# Patient Record
Sex: FEMALE | Race: BLACK OR AFRICAN AMERICAN | Employment: UNEMPLOYED | URBAN - METROPOLITAN AREA
[De-identification: names, ages, dates, MRNs, and addresses within clinical notes are randomized per-mention and may not be internally consistent; named-entity substitution may affect disease eponyms.]

---

## 2020-04-19 ENCOUNTER — APPOINTMENT (OUTPATIENT)
Dept: GENERAL RADIOLOGY | Age: 35
End: 2020-04-19
Payer: COMMERCIAL

## 2020-04-19 ENCOUNTER — HOSPITAL ENCOUNTER (EMERGENCY)
Age: 35
Discharge: HOME | End: 2020-04-19
Attending: EMERGENCY MEDICINE
Payer: COMMERCIAL

## 2020-04-19 ENCOUNTER — APPOINTMENT (OUTPATIENT)
Dept: CT IMAGING | Age: 35
End: 2020-04-19
Payer: COMMERCIAL

## 2020-04-19 VITALS
WEIGHT: 138.89 LBS | SYSTOLIC BLOOD PRESSURE: 120 MMHG | OXYGEN SATURATION: 100 % | HEART RATE: 80 BPM | BODY MASS INDEX: 25.4 KG/M2 | RESPIRATION RATE: 16 BRPM | DIASTOLIC BLOOD PRESSURE: 95 MMHG

## 2020-04-19 PROCEDURE — 70450 CT HEAD/BRAIN W/O DYE: CPT

## 2020-04-19 PROCEDURE — 72125 CT NECK SPINE W/O DYE: CPT

## 2020-04-19 PROCEDURE — 73100 X-RAY EXAM OF WRIST: CPT

## 2020-04-19 PROCEDURE — 99284 EMERGENCY DEPT VISIT MOD MDM: CPT

## 2020-04-19 ASSESSMENT — PAIN - FUNCTIONAL ASSESSMENT: PAIN_FUNCTIONAL_ASSESSMENT: ACTIVITIES ARE NOT PREVENTED

## 2020-04-19 ASSESSMENT — PAIN DESCRIPTION - ONSET: ONSET: GRADUAL

## 2020-04-19 ASSESSMENT — PAIN DESCRIPTION - DESCRIPTORS: DESCRIPTORS: DISCOMFORT

## 2020-04-19 ASSESSMENT — PAIN DESCRIPTION - PROGRESSION: CLINICAL_PROGRESSION: GRADUALLY WORSENING

## 2020-04-19 ASSESSMENT — PAIN SCALES - GENERAL
PAINLEVEL_OUTOF10: 7
PAINLEVEL_OUTOF10: 7

## 2020-04-19 ASSESSMENT — PAIN DESCRIPTION - LOCATION: LOCATION: FACE;JAW;NECK

## 2020-04-19 ASSESSMENT — ENCOUNTER SYMPTOMS
SHORTNESS OF BREATH: 0
ABDOMINAL PAIN: 0

## 2020-04-19 ASSESSMENT — PAIN DESCRIPTION - PAIN TYPE: TYPE: ACUTE PAIN

## 2020-04-19 ASSESSMENT — PAIN DESCRIPTION - FREQUENCY: FREQUENCY: CONTINUOUS
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
## 2020-04-19 NOTE — ED PROVIDER NOTES
tablet by mouth every 6 hours as needed for Pain    SKIN PROTECTANTS, MISC. (EUCERIN) CREAM    Apply topically as needed. ALLERGIES     Patient has no known allergies. FAMILY HISTORY     No family history on file.        SOCIAL HISTORY       Social History     Socioeconomic History    Marital status: Single     Spouse name: Not on file    Number of children: Not on file    Years of education: Not on file    Highest education level: Not on file   Occupational History    Not on file   Social Needs    Financial resource strain: Not on file    Food insecurity     Worry: Not on file     Inability: Not on file    Transportation needs     Medical: Not on file     Non-medical: Not on file   Tobacco Use    Smoking status: Current Every Day Smoker     Packs/day: 0.50     Years: 15.00     Pack years: 7.50    Smokeless tobacco: Former User     Quit date: 6/21/2016   Substance and Sexual Activity    Alcohol use: No    Drug use: No    Sexual activity: Yes     Partners: Male   Lifestyle    Physical activity     Days per week: Not on file     Minutes per session: Not on file    Stress: Not on file   Relationships    Social connections     Talks on phone: Not on file     Gets together: Not on file     Attends Mormon service: Not on file     Active member of club or organization: Not on file     Attends meetings of clubs or organizations: Not on file     Relationship status: Not on file    Intimate partner violence     Fear of current or ex partner: Not on file     Emotionally abused: Not on file     Physically abused: Not on file     Forced sexual activity: Not on file   Other Topics Concern    Not on file   Social History Narrative    Not on file       SCREENINGS                PHYSICAL EXAM    (up to 7 for level 4, 8 or more for level 5)     ED Triage Vitals [04/19/20 0157]   BP Temp Temp src Pulse Resp SpO2 Height Weight   (!) 120/95 -- -- 80 16 100 % -- 138 lb 14.2 oz (63 kg)       Physical

## 2020-07-08 ENCOUNTER — HOSPITAL ENCOUNTER (EMERGENCY)
Age: 35
Discharge: HOME | End: 2020-07-08
Attending: EMERGENCY MEDICINE
Payer: COMMERCIAL

## 2020-07-08 VITALS
WEIGHT: 146.61 LBS | BODY MASS INDEX: 26.98 KG/M2 | HEART RATE: 94 BPM | DIASTOLIC BLOOD PRESSURE: 71 MMHG | HEIGHT: 62 IN | SYSTOLIC BLOOD PRESSURE: 101 MMHG | TEMPERATURE: 98.3 F | RESPIRATION RATE: 11 BRPM | OXYGEN SATURATION: 98 %

## 2020-07-08 LAB
A/G RATIO: 2.1 (ref 1.1–2.2)
ALBUMIN SERPL-MCNC: 4.8 G/DL (ref 3.4–5)
ALP BLD-CCNC: 82 U/L (ref 40–129)
ALT SERPL-CCNC: 19 U/L (ref 10–40)
AMPHETAMINE SCREEN, URINE: ABNORMAL
ANION GAP SERPL CALCULATED.3IONS-SCNC: 18 MMOL/L (ref 3–16)
AST SERPL-CCNC: 22 U/L (ref 15–37)
BARBITURATE SCREEN URINE: ABNORMAL
BASE EXCESS VENOUS: -3.5 MMOL/L
BASOPHILS ABSOLUTE: 0.1 K/UL (ref 0–0.2)
BASOPHILS RELATIVE PERCENT: 0.7 %
BENZODIAZEPINE SCREEN, URINE: ABNORMAL
BILIRUB SERPL-MCNC: <0.2 MG/DL (ref 0–1)
BILIRUBIN URINE: NEGATIVE
BLOOD, URINE: NEGATIVE
BUN BLDV-MCNC: 19 MG/DL (ref 7–20)
CALCIUM SERPL-MCNC: 8.7 MG/DL (ref 8.3–10.6)
CANNABINOID SCREEN URINE: ABNORMAL
CARBOXYHEMOGLOBIN: 3.8 %
CHLORIDE BLD-SCNC: 102 MMOL/L (ref 99–110)
CLARITY: ABNORMAL
CO2: 20 MMOL/L (ref 21–32)
COCAINE METABOLITE SCREEN URINE: POSITIVE
COLOR: YELLOW
CREAT SERPL-MCNC: 0.7 MG/DL (ref 0.6–1.1)
EOSINOPHILS ABSOLUTE: 0.1 K/UL (ref 0–0.6)
EOSINOPHILS RELATIVE PERCENT: 0.4 %
EPITHELIAL CELLS, UA: 3 /HPF (ref 0–5)
ETHANOL: 31 MG/DL (ref 0–0.08)
GFR AFRICAN AMERICAN: >60
GFR NON-AFRICAN AMERICAN: >60
GLOBULIN: 2.3 G/DL
GLUCOSE BLD-MCNC: 147 MG/DL (ref 70–99)
GLUCOSE URINE: NEGATIVE MG/DL
HCG QUALITATIVE: NEGATIVE
HCO3 VENOUS: 23 MMOL/L (ref 23–29)
HCT VFR BLD CALC: 36.4 % (ref 36–48)
HEMOGLOBIN: 12 G/DL (ref 12–16)
HYALINE CASTS: 6 /LPF (ref 0–8)
KETONES, URINE: NEGATIVE MG/DL
LACTIC ACID: 3.8 MMOL/L (ref 0.4–2)
LEUKOCYTE ESTERASE, URINE: NEGATIVE
LYMPHOCYTES ABSOLUTE: 4.1 K/UL (ref 1–5.1)
LYMPHOCYTES RELATIVE PERCENT: 29 %
Lab: ABNORMAL
MAGNESIUM: 2.2 MG/DL (ref 1.8–2.4)
MCH RBC QN AUTO: 30.6 PG (ref 26–34)
MCHC RBC AUTO-ENTMCNC: 33 G/DL (ref 31–36)
MCV RBC AUTO: 92.5 FL (ref 80–100)
METHADONE SCREEN, URINE: ABNORMAL
METHEMOGLOBIN VENOUS: 0.4 %
MICROSCOPIC EXAMINATION: YES
MONOCYTES ABSOLUTE: 0.7 K/UL (ref 0–1.3)
MONOCYTES RELATIVE PERCENT: 5 %
NEUTROPHILS ABSOLUTE: 9.2 K/UL (ref 1.7–7.7)
NEUTROPHILS RELATIVE PERCENT: 64.9 %
NITRITE, URINE: NEGATIVE
O2 CONTENT, VEN: 8 ML/DL
O2 SAT, VEN: 50 %
O2 THERAPY: ABNORMAL
OPIATE SCREEN URINE: ABNORMAL
OXYCODONE URINE: ABNORMAL
PCO2, VEN: 47.3 MMHG (ref 40–50)
PDW BLD-RTO: 13.1 % (ref 12.4–15.4)
PH UA: 5
PH UA: 5.5 (ref 5–8)
PH VENOUS: 7.3 (ref 7.35–7.45)
PHENCYCLIDINE SCREEN URINE: ABNORMAL
PLATELET # BLD: 229 K/UL (ref 135–450)
PMV BLD AUTO: 8.8 FL (ref 5–10.5)
PO2, VEN: 29 MMHG
POTASSIUM REFLEX MAGNESIUM: 3.4 MMOL/L (ref 3.5–5.1)
PROPOXYPHENE SCREEN: ABNORMAL
PROTEIN UA: 30 MG/DL
RBC # BLD: 3.94 M/UL (ref 4–5.2)
RBC UA: 1 /HPF (ref 0–4)
SODIUM BLD-SCNC: 140 MMOL/L (ref 136–145)
SPECIFIC GRAVITY UA: >1.03 (ref 1–1.03)
TCO2 CALC VENOUS: 25 MMOL/L
TOTAL PROTEIN: 7.1 G/DL (ref 6.4–8.2)
TROPONIN: <0.01 NG/ML
URINE REFLEX TO CULTURE: ABNORMAL
URINE TYPE: ABNORMAL
UROBILINOGEN, URINE: 0.2 E.U./DL
WBC # BLD: 14.2 K/UL (ref 4–11)
WBC UA: 2 /HPF (ref 0–5)

## 2020-07-08 PROCEDURE — 80053 COMPREHEN METABOLIC PANEL: CPT

## 2020-07-08 PROCEDURE — 85025 COMPLETE CBC W/AUTO DIFF WBC: CPT

## 2020-07-08 PROCEDURE — 84484 ASSAY OF TROPONIN QUANT: CPT

## 2020-07-08 PROCEDURE — 80307 DRUG TEST PRSMV CHEM ANLYZR: CPT

## 2020-07-08 PROCEDURE — G0480 DRUG TEST DEF 1-7 CLASSES: HCPCS

## 2020-07-08 PROCEDURE — 81001 URINALYSIS AUTO W/SCOPE: CPT

## 2020-07-08 PROCEDURE — 2580000003 HC RX 258: Performed by: EMERGENCY MEDICINE

## 2020-07-08 PROCEDURE — 84703 CHORIONIC GONADOTROPIN ASSAY: CPT

## 2020-07-08 PROCEDURE — 96374 THER/PROPH/DIAG INJ IV PUSH: CPT

## 2020-07-08 PROCEDURE — 6360000002 HC RX W HCPCS: Performed by: EMERGENCY MEDICINE

## 2020-07-08 PROCEDURE — 96376 TX/PRO/DX INJ SAME DRUG ADON: CPT

## 2020-07-08 PROCEDURE — 82803 BLOOD GASES ANY COMBINATION: CPT

## 2020-07-08 PROCEDURE — 99284 EMERGENCY DEPT VISIT MOD MDM: CPT

## 2020-07-08 PROCEDURE — 83735 ASSAY OF MAGNESIUM: CPT

## 2020-07-08 PROCEDURE — 83605 ASSAY OF LACTIC ACID: CPT

## 2020-07-08 RX ORDER — 0.9 % SODIUM CHLORIDE 0.9 %
1000 INTRAVENOUS SOLUTION INTRAVENOUS ONCE
Status: COMPLETED | OUTPATIENT
Start: 2020-07-08 | End: 2020-07-08

## 2020-07-08 RX ORDER — ONDANSETRON 2 MG/ML
8 INJECTION INTRAMUSCULAR; INTRAVENOUS ONCE
Status: DISCONTINUED | OUTPATIENT
Start: 2020-07-08 | End: 2020-07-08 | Stop reason: HOSPADM

## 2020-07-08 RX ORDER — NALOXONE HYDROCHLORIDE 1 MG/ML
2 INJECTION INTRAMUSCULAR; INTRAVENOUS; SUBCUTANEOUS ONCE
Status: COMPLETED | OUTPATIENT
Start: 2020-07-08 | End: 2020-07-08

## 2020-07-08 RX ADMIN — SODIUM CHLORIDE 1000 ML: 9 INJECTION, SOLUTION INTRAVENOUS at 09:27

## 2020-07-08 RX ADMIN — NALOXONE HYDROCHLORIDE 2 MG: 1 INJECTION PARENTERAL at 13:59

## 2020-07-08 RX ADMIN — NALOXONE HYDROCHLORIDE 2 MG: 1 INJECTION PARENTERAL at 08:55
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
## 2020-07-08 NOTE — ED NOTES
Attempting to find a place for patient to go. She does not have house keys. Contacted mother. Family refuses to pick her up and keep her.       Alaina Goddard RN  07/08/20 8514

## 2020-11-21 ENCOUNTER — APPOINTMENT (OUTPATIENT)
Dept: CT IMAGING | Age: 35
End: 2020-11-21
Payer: COMMERCIAL

## 2020-11-21 ENCOUNTER — HOSPITAL ENCOUNTER (EMERGENCY)
Age: 35
Discharge: HOME | End: 2020-11-21
Attending: EMERGENCY MEDICINE
Payer: COMMERCIAL

## 2020-11-21 VITALS
BODY MASS INDEX: 25.76 KG/M2 | HEIGHT: 62 IN | TEMPERATURE: 98.5 F | SYSTOLIC BLOOD PRESSURE: 114 MMHG | WEIGHT: 140 LBS | OXYGEN SATURATION: 99 % | DIASTOLIC BLOOD PRESSURE: 65 MMHG | HEART RATE: 102 BPM | RESPIRATION RATE: 14 BRPM

## 2020-11-21 PROCEDURE — 99283 EMERGENCY DEPT VISIT LOW MDM: CPT

## 2020-11-21 PROCEDURE — 70450 CT HEAD/BRAIN W/O DYE: CPT

## 2020-11-21 RX ORDER — PROMETHAZINE HYDROCHLORIDE 12.5 MG/1
12.5 TABLET ORAL 4 TIMES DAILY PRN
Qty: 20 TABLET | Refills: 0 | Status: SHIPPED | OUTPATIENT
Start: 2020-11-21 | End: 2020-11-28

## 2020-11-21 RX ORDER — MELOXICAM 7.5 MG/1
7.5 TABLET ORAL DAILY
Qty: 90 TABLET | Refills: 1 | Status: SHIPPED | OUTPATIENT
Start: 2020-11-21 | End: 2021-12-17 | Stop reason: ALTCHOICE

## 2020-11-21 RX ORDER — ONDANSETRON 4 MG/1
4 TABLET, FILM COATED ORAL DAILY PRN
Qty: 30 TABLET | Refills: 0 | Status: SHIPPED | OUTPATIENT
Start: 2020-11-21 | End: 2021-12-17 | Stop reason: ALTCHOICE

## 2020-11-21 ASSESSMENT — PAIN DESCRIPTION - DESCRIPTORS: DESCRIPTORS: ACHING;DISCOMFORT

## 2020-11-21 ASSESSMENT — PAIN DESCRIPTION - FREQUENCY: FREQUENCY: CONTINUOUS

## 2020-11-21 ASSESSMENT — PAIN SCALES - GENERAL: PAINLEVEL_OUTOF10: 8

## 2020-11-21 ASSESSMENT — PAIN DESCRIPTION - PAIN TYPE: TYPE: ACUTE PAIN

## 2020-11-21 ASSESSMENT — PAIN DESCRIPTION - LOCATION: LOCATION: FACE

## 2020-11-21 ASSESSMENT — PAIN DESCRIPTION - ORIENTATION: ORIENTATION: LEFT
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
## 2021-04-11 ENCOUNTER — HOSPITAL ENCOUNTER (EMERGENCY)
Age: 36
Discharge: LEFT BEFORE DISCHARGE | End: 2021-04-11
Attending: STUDENT IN AN ORGANIZED HEALTH CARE EDUCATION/TRAINING PROGRAM
Payer: COMMERCIAL

## 2021-04-11 VITALS
OXYGEN SATURATION: 100 % | HEART RATE: 88 BPM | SYSTOLIC BLOOD PRESSURE: 107 MMHG | WEIGHT: 140 LBS | TEMPERATURE: 98.7 F | RESPIRATION RATE: 16 BRPM | HEIGHT: 62 IN | BODY MASS INDEX: 25.76 KG/M2 | DIASTOLIC BLOOD PRESSURE: 83 MMHG
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
## 2021-04-11 LAB
A/G RATIO: 1.8 (ref 1.1–2.2)
ALBUMIN SERPL-MCNC: 4.6 G/DL (ref 3.4–5)
ALP BLD-CCNC: 87 U/L (ref 40–129)
ALT SERPL-CCNC: 10 U/L (ref 10–40)
ANION GAP SERPL CALCULATED.3IONS-SCNC: 10 MMOL/L (ref 3–16)
AST SERPL-CCNC: 14 U/L (ref 15–37)
BASOPHILS ABSOLUTE: 0.1 K/UL (ref 0–0.2)
BASOPHILS RELATIVE PERCENT: 0.7 %
BILIRUB SERPL-MCNC: 0.4 MG/DL (ref 0–1)
BUN BLDV-MCNC: 8 MG/DL (ref 7–20)
CALCIUM SERPL-MCNC: 9.2 MG/DL (ref 8.3–10.6)
CHLORIDE BLD-SCNC: 105 MMOL/L (ref 99–110)
CO2: 24 MMOL/L (ref 21–32)
CREAT SERPL-MCNC: 0.8 MG/DL (ref 0.6–1.1)
EOSINOPHILS ABSOLUTE: 0.3 K/UL (ref 0–0.6)
EOSINOPHILS RELATIVE PERCENT: 2.7 %
GFR AFRICAN AMERICAN: >60
GFR NON-AFRICAN AMERICAN: >60
GLOBULIN: 2.6 G/DL
GLUCOSE BLD-MCNC: 96 MG/DL (ref 70–99)
GONADOTROPIN, CHORIONIC (HCG) QUANT: <5 MIU/ML
HCT VFR BLD CALC: 41.6 % (ref 36–48)
HEMOGLOBIN: 13.9 G/DL (ref 12–16)
LYMPHOCYTES ABSOLUTE: 3.7 K/UL (ref 1–5.1)
LYMPHOCYTES RELATIVE PERCENT: 33.1 %
MCH RBC QN AUTO: 30.9 PG (ref 26–34)
MCHC RBC AUTO-ENTMCNC: 33.5 G/DL (ref 31–36)
MCV RBC AUTO: 92.2 FL (ref 80–100)
MONOCYTES ABSOLUTE: 0.6 K/UL (ref 0–1.3)
MONOCYTES RELATIVE PERCENT: 5.3 %
NEUTROPHILS ABSOLUTE: 6.6 K/UL (ref 1.7–7.7)
NEUTROPHILS RELATIVE PERCENT: 58.2 %
PDW BLD-RTO: 12.7 % (ref 12.4–15.4)
PLATELET # BLD: 249 K/UL (ref 135–450)
PMV BLD AUTO: 8.7 FL (ref 5–10.5)
POTASSIUM REFLEX MAGNESIUM: 3.7 MMOL/L (ref 3.5–5.1)
RBC # BLD: 4.51 M/UL (ref 4–5.2)
SODIUM BLD-SCNC: 139 MMOL/L (ref 136–145)
TOTAL PROTEIN: 7.2 G/DL (ref 6.4–8.2)
WBC # BLD: 11.3 K/UL (ref 4–11)

## 2021-04-11 PROCEDURE — 80053 COMPREHEN METABOLIC PANEL: CPT

## 2021-04-11 PROCEDURE — 84702 CHORIONIC GONADOTROPIN TEST: CPT

## 2021-04-11 PROCEDURE — 99283 EMERGENCY DEPT VISIT LOW MDM: CPT

## 2021-04-11 PROCEDURE — 85025 COMPLETE CBC W/AUTO DIFF WBC: CPT

## 2021-04-11 ASSESSMENT — ENCOUNTER SYMPTOMS
SHORTNESS OF BREATH: 0
CHOKING: 0
BACK PAIN: 0
ABDOMINAL DISTENTION: 0
ABDOMINAL PAIN: 1
CONSTIPATION: 0
BLOOD IN STOOL: 0
WHEEZING: 0
DIARRHEA: 0

## 2021-04-11 ASSESSMENT — PAIN SCALES - GENERAL: PAINLEVEL_OUTOF10: 9
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
## 2021-04-11 NOTE — ED PROVIDER NOTES
[address] [address] RESIDENT NOTE       Date of evaluation: 4/11/2021    Chief Complaint     Fall (pt running from a store and fell onto her rt hip )      History of Present Illness     Cielo Onofre is a 37 y.o. female with a past medical history of schizoaffective disorder, anxiety, opioid use disorder who presents to the hospital after a fall. The patient reported that her boyfriend is in correction and she has been engaging in sexual activity to provide for self. She was with a new partner in a grocery store when her try to go behind the counter and it appeared like he was trying to jose luis when the owner of the store pullout done and for warfarin ran from the store and she followed him. While she was running she fell down and fell on her right hip. After that she decided to come to the ED to get checked up. She also reports that she is 3 months pregnant. She complains of generalized abdominal cramps however when asked if she can describe the discomfort and will more detailed she refuses. She also reports that she was bleeding from her vagina a week ago which stopped on its own and then she noticed blood coming out from her nares and again this morning which also stopped on its own. Those bleeding episodes were not associated with contractions cramps or pain. She denies any headache, chest pain, shortness of breath, nausea, vomiting, diarrhea, constipation. Review of Systems     Review of Systems   Constitutional: Negative for activity change, appetite change, chills, fatigue and fever. HENT: Negative for ear discharge, ear pain and mouth sores. Respiratory: Negative for choking, shortness of breath and wheezing. Cardiovascular: Negative for chest pain, palpitations and leg swelling. Gastrointestinal: Positive for abdominal pain. Negative for abdominal distention, blood in stool, constipation and diarrhea.    Genitourinary: Positive for pelvic pain and vaginal bleeding. Negative for urgency, vaginal discharge and vaginal pain. Right-sided hip pain   Musculoskeletal: Negative for back pain, gait problem and joint swelling. Neurological: Negative for dizziness, facial asymmetry, light-headedness and numbness. Psychiatric/Behavioral: Negative for agitation, behavioral problems, confusion, decreased concentration and dysphoric mood. Past Medical, Surgical, Family, and Social History     She has no past medical history on file. She has no past surgical history on file. Her family history is not on file. She reports that she has been smoking. She has a 7.50 pack-year smoking history. She quit smokeless tobacco use about 4 years ago. She reports that she does not drink alcohol or use drugs. Medications     Discharge Medication List as of 4/11/2021  8:50 PM      CONTINUE these medications which have NOT CHANGED    Details   ondansetron (ZOFRAN) 4 MG tablet Take 1 tablet by mouth daily as needed for Nausea or Vomiting, Disp-30 tablet,R-0Print      meloxicam (MOBIC) 7.5 MG tablet Take 1 tablet by mouth daily, Disp-90 tablet,R-1Print      ibuprofen (ADVIL;MOTRIN) 600 MG tablet Take 1 tablet by mouth every 6 hours as needed for Pain, Disp-30 tablet, R-0Print      Skin Protectants, Misc. (EUCERIN) cream Apply topically as needed. , Disp-113 g, R-0, Print             Allergies     She has No Known Allergies. Physical Exam     INITIAL VITALS: BP: 107/83, Temp: 98.7 °F (37.1 °C), Pulse: 88, Resp: 16, SpO2: 100 %   Physical Exam  Constitutional:       General: She is not in acute distress. Appearance: Normal appearance. She is not ill-appearing. HENT:      Head: Normocephalic and atraumatic. Cardiovascular:      Rate and Rhythm: Normal rate and regular rhythm. Pulses: Normal pulses. Heart sounds: Normal heart sounds. No murmur. Pulmonary:      Effort: Pulmonary effort is normal.   Abdominal:      General: Abdomen is flat.  There is no distension. Tenderness: There is no abdominal tenderness. There is no guarding. Musculoskeletal:         General: No swelling, tenderness or deformity. Right lower leg: No edema. Left lower leg: No edema. Skin:     General: Skin is warm. Capillary Refill: Capillary refill takes less than 2 seconds. Neurological:      Mental Status: She is alert and oriented to person, place, and time.    Psychiatric:         Mood and Affect: Mood normal.         Behavior: Behavior normal.             Diagnostic Results     RADIOLOGY:  No orders to display       LABS:   Results for orders placed or performed during the hospital encounter of 04/11/21   CBC auto differential   Result Value Ref Range    WBC 11.3 (H) 4.0 - 11.0 K/uL    RBC 4.51 4.00 - 5.20 M/uL    Hemoglobin 13.9 12.0 - 16.0 g/dL    Hematocrit 41.6 36.0 - 48.0 %    MCV 92.2 80.0 - 100.0 fL    MCH 30.9 26.0 - 34.0 pg    MCHC 33.5 31.0 - 36.0 g/dL    RDW 12.7 12.4 - 15.4 %    Platelets 872 404 - 769 K/uL    MPV 8.7 5.0 - 10.5 fL    Neutrophils % 58.2 %    Lymphocytes % 33.1 %    Monocytes % 5.3 %    Eosinophils % 2.7 %    Basophils % 0.7 %    Neutrophils Absolute 6.6 1.7 - 7.7 K/uL    Lymphocytes Absolute 3.7 1.0 - 5.1 K/uL    Monocytes Absolute 0.6 0.0 - 1.3 K/uL    Eosinophils Absolute 0.3 0.0 - 0.6 K/uL    Basophils Absolute 0.1 0.0 - 0.2 K/uL   Comprehensive Metabolic Panel w/ Reflex to MG   Result Value Ref Range    Sodium 139 136 - 145 mmol/L    Potassium reflex Magnesium 3.7 3.5 - 5.1 mmol/L    Chloride 105 99 - 110 mmol/L    CO2 24 21 - 32 mmol/L    Anion Gap 10 3 - 16    Glucose 96 70 - 99 mg/dL    BUN 8 7 - 20 mg/dL    CREATININE 0.8 0.6 - 1.1 mg/dL    GFR Non-African American >60 >60    GFR African American >60 >60    Calcium 9.2 8.3 - 10.6 mg/dL    Total Protein 7.2 6.4 - 8.2 g/dL    Albumin 4.6 3.4 - 5.0 g/dL    Albumin/Globulin Ratio 1.8 1.1 - 2.2    Total Bilirubin 0.4 0.0 - 1.0 mg/dL    Alkaline Phosphatase 87 40 - 129 U/L    ALT 10 10 - 40 U/L    AST 14 (L) 15 - 37 U/L    Globulin 2.6 g/dL   hCG, quantitative, pregnancy (Lab)   Result Value Ref Range    hCG Quant <5.0 <5.0 mIU/mL       ED BEDSIDE ULTRASOUND:  Bedside ultrasound did not show fetal heartbeat or a fetus. RECENT VITALS:  BP: 107/83, Temp: 98.7 °F (37.1 °C),Pulse: 88, Resp: 16, SpO2: 100 %     Procedures     None    ED Course     Nursing Notes, Past Medical Hx, Past Surgical Hx, Social Hx, Allergies, and FamilyHx were reviewed. The patient was giventhe following medications:  No orders of the defined types were placed in this encounter. CONSULTS:  None    MEDICAL DECISION MAKING / ASSESSMENT / Yanet Hussein is a 37 y.o. female who presented with fall. Vitals in the ED were stable. She did not report any noteworthy pain anywhere. Her bedside ultrasound did not show fetal heartbeat or a fetus. Her CBC and CMP were normal.  Her serum hCG was less than 5.0 MIU/ml. While we were awaiting her lab results she said she does not want to stay in the hospital anymore and wanted to leave. The hospital staff tried to ask her the reason and asked if there is anything they can do to make her more comfortable however the patient was adamant that she does not want to stay in the hospital anymore without giving a reason and she eloped. This patient was also evaluated by the attending physician. All care plans were discussed and agreed upon. Clinical Impression     1. Fall, initial encounter        Disposition     PATIENT REFERRED TO:  No follow-up provider specified.     DISCHARGE MEDICATIONS:  Discharge Medication List as of 4/11/2021  8:50 PM          DISPOSITION Eloped - Left Before Treatment Complete 04/11/2021 09:04:47 PM      Rico Srivastava MD  Resident  04/11/21 [id number]

## 2021-04-12 NOTE — ED NOTES
When pt arrived pt ran to a restroom in the back hallway and defecated on the floor then threw stool on another MARISSA Santana RN  04/11/21 2049

## 2021-12-17 ENCOUNTER — OFFICE VISIT (OUTPATIENT)
Dept: PRIMARY CARE CLINIC | Age: 36
End: 2021-12-17
Payer: COMMERCIAL

## 2021-12-17 VITALS
DIASTOLIC BLOOD PRESSURE: 76 MMHG | TEMPERATURE: 97.3 F | SYSTOLIC BLOOD PRESSURE: 120 MMHG | BODY MASS INDEX: 27.07 KG/M2 | HEART RATE: 102 BPM | WEIGHT: 148 LBS

## 2021-12-17 DIAGNOSIS — F51.01 PRIMARY INSOMNIA: ICD-10-CM

## 2021-12-17 DIAGNOSIS — Z00.00 ANNUAL PHYSICAL EXAM: Primary | ICD-10-CM

## 2021-12-17 DIAGNOSIS — R10.13 EPIGASTRIC PAIN: ICD-10-CM

## 2021-12-17 DIAGNOSIS — Z76.89 ENCOUNTER FOR ASSESSMENT OF STD EXPOSURE: ICD-10-CM

## 2021-12-17 DIAGNOSIS — Z30.011 ENCOUNTER FOR INITIAL PRESCRIPTION OF CONTRACEPTIVE PILLS: ICD-10-CM

## 2021-12-17 DIAGNOSIS — F41.9 ANXIETY AND DEPRESSION: ICD-10-CM

## 2021-12-17 DIAGNOSIS — M79.675 PAIN AROUND TOENAIL, LEFT FOOT: ICD-10-CM

## 2021-12-17 DIAGNOSIS — F10.21 ALCOHOL USE DISORDER, SEVERE, IN EARLY REMISSION, DEPENDENCE (HCC): ICD-10-CM

## 2021-12-17 DIAGNOSIS — F11.21 OPIOID USE DISORDER, SEVERE, IN EARLY REMISSION (HCC): ICD-10-CM

## 2021-12-17 DIAGNOSIS — K59.04 CHRONIC IDIOPATHIC CONSTIPATION: ICD-10-CM

## 2021-12-17 DIAGNOSIS — F98.8 ATTENTION DEFICIT DISORDER (ADD) WITHOUT HYPERACTIVITY: ICD-10-CM

## 2021-12-17 DIAGNOSIS — F32.A ANXIETY AND DEPRESSION: ICD-10-CM

## 2021-12-17 DIAGNOSIS — Z00.00 ANNUAL PHYSICAL EXAM: ICD-10-CM

## 2021-12-17 PROBLEM — F43.10 POSTTRAUMATIC STRESS DISORDER: Status: ACTIVE | Noted: 2019-11-04

## 2021-12-17 PROBLEM — F11.20 OPIOID USE DISORDER, MODERATE, DEPENDENCE (HCC): Status: ACTIVE | Noted: 2021-12-17

## 2021-12-17 PROBLEM — R45.851 DEPRESSION WITH SUICIDAL IDEATION: Status: ACTIVE | Noted: 2021-12-17

## 2021-12-17 PROBLEM — F11.20 OPIOID USE DISORDER, MODERATE, DEPENDENCE (HCC): Status: RESOLVED | Noted: 2021-12-17 | Resolved: 2021-12-17

## 2021-12-17 PROBLEM — R45.851 DEPRESSION WITH SUICIDAL IDEATION: Status: RESOLVED | Noted: 2021-12-17 | Resolved: 2021-12-17

## 2021-12-17 LAB
A/G RATIO: 2 (ref 1.1–2.2)
ALBUMIN SERPL-MCNC: 4.3 G/DL (ref 3.4–5)
ALP BLD-CCNC: 73 U/L (ref 40–129)
ALT SERPL-CCNC: 24 U/L (ref 10–40)
ANION GAP SERPL CALCULATED.3IONS-SCNC: 15 MMOL/L (ref 3–16)
AST SERPL-CCNC: 18 U/L (ref 15–37)
BASOPHILS ABSOLUTE: 0 K/UL (ref 0–0.2)
BASOPHILS RELATIVE PERCENT: 0.5 %
BILIRUB SERPL-MCNC: 0.3 MG/DL (ref 0–1)
BUN BLDV-MCNC: 7 MG/DL (ref 7–20)
CALCIUM SERPL-MCNC: 9.1 MG/DL (ref 8.3–10.6)
CHLORIDE BLD-SCNC: 99 MMOL/L (ref 99–110)
CHOLESTEROL, TOTAL: 125 MG/DL (ref 0–199)
CO2: 24 MMOL/L (ref 21–32)
CREAT SERPL-MCNC: 0.5 MG/DL (ref 0.6–1.1)
EOSINOPHILS ABSOLUTE: 0.2 K/UL (ref 0–0.6)
EOSINOPHILS RELATIVE PERCENT: 3.4 %
GFR AFRICAN AMERICAN: >60
GFR NON-AFRICAN AMERICAN: >60
GLUCOSE BLD-MCNC: 112 MG/DL (ref 70–99)
HCG QUALITATIVE: POSITIVE
HCT VFR BLD CALC: 34.9 % (ref 36–48)
HDLC SERPL-MCNC: 59 MG/DL (ref 40–60)
HEMOGLOBIN: 11.7 G/DL (ref 12–16)
HEPATITIS C ANTIBODY INTERPRETATION: NORMAL
LDL CHOLESTEROL CALCULATED: 55 MG/DL
LIPASE: 15 U/L (ref 13–60)
LYMPHOCYTES ABSOLUTE: 1.7 K/UL (ref 1–5.1)
LYMPHOCYTES RELATIVE PERCENT: 27.3 %
MCH RBC QN AUTO: 30.8 PG (ref 26–34)
MCHC RBC AUTO-ENTMCNC: 33.5 G/DL (ref 31–36)
MCV RBC AUTO: 92 FL (ref 80–100)
MONOCYTES ABSOLUTE: 0.5 K/UL (ref 0–1.3)
MONOCYTES RELATIVE PERCENT: 7.7 %
NEUTROPHILS ABSOLUTE: 3.7 K/UL (ref 1.7–7.7)
NEUTROPHILS RELATIVE PERCENT: 61.1 %
PDW BLD-RTO: 12.7 % (ref 12.4–15.4)
PLATELET # BLD: 175 K/UL (ref 135–450)
PMV BLD AUTO: 8.7 FL (ref 5–10.5)
POTASSIUM SERPL-SCNC: 4.5 MMOL/L (ref 3.5–5.1)
RBC # BLD: 3.79 M/UL (ref 4–5.2)
SODIUM BLD-SCNC: 138 MMOL/L (ref 136–145)
SPECIMEN TYPE: NORMAL
TOTAL PROTEIN: 6.5 G/DL (ref 6.4–8.2)
TRICHOMONAS VAGINALIS SCREEN: NEGATIVE
TRIGL SERPL-MCNC: 55 MG/DL (ref 0–150)
VLDLC SERPL CALC-MCNC: 11 MG/DL
WBC # BLD: 6.1 K/UL (ref 4–11)

## 2021-12-17 PROCEDURE — 99204 OFFICE O/P NEW MOD 45 MIN: CPT | Performed by: STUDENT IN AN ORGANIZED HEALTH CARE EDUCATION/TRAINING PROGRAM

## 2021-12-17 PROCEDURE — 99385 PREV VISIT NEW AGE 18-39: CPT | Performed by: STUDENT IN AN ORGANIZED HEALTH CARE EDUCATION/TRAINING PROGRAM

## 2021-12-17 PROCEDURE — G8419 CALC BMI OUT NRM PARAM NOF/U: HCPCS | Performed by: STUDENT IN AN ORGANIZED HEALTH CARE EDUCATION/TRAINING PROGRAM

## 2021-12-17 PROCEDURE — G8484 FLU IMMUNIZE NO ADMIN: HCPCS | Performed by: STUDENT IN AN ORGANIZED HEALTH CARE EDUCATION/TRAINING PROGRAM

## 2021-12-17 PROCEDURE — G8427 DOCREV CUR MEDS BY ELIG CLIN: HCPCS | Performed by: STUDENT IN AN ORGANIZED HEALTH CARE EDUCATION/TRAINING PROGRAM

## 2021-12-17 PROCEDURE — 4004F PT TOBACCO SCREEN RCVD TLK: CPT | Performed by: STUDENT IN AN ORGANIZED HEALTH CARE EDUCATION/TRAINING PROGRAM

## 2021-12-17 RX ORDER — LISDEXAMFETAMINE DIMESYLATE 30 MG/1
CAPSULE ORAL
COMMUNITY
Start: 2021-12-13 | End: 2022-01-03 | Stop reason: ALTCHOICE

## 2021-12-17 RX ORDER — CARIPRAZINE 3 MG/1
CAPSULE, GELATIN COATED ORAL
COMMUNITY
Start: 2021-12-13 | End: 2022-01-03 | Stop reason: ALTCHOICE

## 2021-12-17 RX ORDER — TRAZODONE HYDROCHLORIDE 100 MG/1
TABLET ORAL
COMMUNITY
Start: 2021-12-13 | End: 2022-01-03 | Stop reason: ALTCHOICE

## 2021-12-17 RX ORDER — NORETHINDRONE ACETATE AND ETHINYL ESTRADIOL 1MG-20(21)
1 KIT ORAL DAILY
Qty: 1 PACKET | Refills: 11 | Status: SHIPPED | OUTPATIENT
Start: 2021-12-17 | End: 2021-12-21

## 2021-12-17 RX ORDER — BUPRENORPHINE HYDROCHLORIDE AND NALOXONE HYDROCHLORIDE DIHYDRATE 8; 2 MG/1; MG/1
TABLET SUBLINGUAL
COMMUNITY
Start: 2021-12-13 | End: 2022-01-03 | Stop reason: ALTCHOICE

## 2021-12-17 RX ORDER — SODIUM PHOSPHATE, DIBASIC AND SODIUM PHOSPHATE, MONOBASIC 7; 19 G/133ML; G/133ML
1 ENEMA RECTAL
Qty: 118 ML | Refills: 0 | Status: SHIPPED | OUTPATIENT
Start: 2021-12-17 | End: 2021-12-17

## 2021-12-17 SDOH — ECONOMIC STABILITY: FOOD INSECURITY: WITHIN THE PAST 12 MONTHS, YOU WORRIED THAT YOUR FOOD WOULD RUN OUT BEFORE YOU GOT MONEY TO BUY MORE.: NEVER TRUE

## 2021-12-17 SDOH — ECONOMIC STABILITY: FOOD INSECURITY: WITHIN THE PAST 12 MONTHS, THE FOOD YOU BOUGHT JUST DIDN'T LAST AND YOU DIDN'T HAVE MONEY TO GET MORE.: NEVER TRUE

## 2021-12-17 ASSESSMENT — ENCOUNTER SYMPTOMS
VOMITING: 0
COUGH: 0
CONSTIPATION: 1
ABDOMINAL DISTENTION: 0
ABDOMINAL PAIN: 1
ANAL BLEEDING: 0
SHORTNESS OF BREATH: 0
RECTAL PAIN: 0
NAUSEA: 0
DIARRHEA: 0
BLOOD IN STOOL: 0

## 2021-12-17 ASSESSMENT — PATIENT HEALTH QUESTIONNAIRE - PHQ9
SUM OF ALL RESPONSES TO PHQ QUESTIONS 1-9: 0
SUM OF ALL RESPONSES TO PHQ QUESTIONS 1-9: 0
1. LITTLE INTEREST OR PLEASURE IN DOING THINGS: 0
SUM OF ALL RESPONSES TO PHQ QUESTIONS 1-9: 0
2. FEELING DOWN, DEPRESSED OR HOPELESS: 0
SUM OF ALL RESPONSES TO PHQ9 QUESTIONS 1 & 2: 0

## 2021-12-17 ASSESSMENT — SOCIAL DETERMINANTS OF HEALTH (SDOH): HOW HARD IS IT FOR YOU TO PAY FOR THE VERY BASICS LIKE FOOD, HOUSING, MEDICAL CARE, AND HEATING?: NOT VERY HARD

## 2021-12-17 NOTE — PROGRESS NOTES
[facility]  Establish care visit   2021    Edin Macias (:  1985) is a 31 y.o. female, here to establish care. Chief Complaint   Patient presents with    Established New Doctor        ASSESSMENT/ PLAN  1. Annual physical exam  Screening labs ordered, due for Covid booster, flu vaccine and pneumonia vaccine. Follow-up for Pap smear. Discussed 150 minutes of exercise and healthy dietary changes. - Comprehensive Metabolic Panel; Future  - Hemoglobin A1C; Future  - Lipid Panel; Future    2. Pain around toenail, left foot  Referral to podiatry per patient request.  Exam benign.  - [facility] - Rootring, Thresa Morel., DPM, Podiatry, [place]    3. Encounter for assessment of STD exposure  Asymptomatic, screening labs ordered  - Hepatitis C Antibody; Future  - HIV Screen; Future  - C.trachomatis N.gonorrhoeae DNA, Urine; Future  - RPR Reflex; Future  - Trichomonas by EIA; Future    4. Encounter for initial prescription of contraceptive pills  - HCG, SERUM, QUALITATIVE; Future  - norethindrone-ethinyl estradiol (LOESTRIN FE ) 1-20 MG-MCG per tablet; Take 1 tablet by mouth daily  Dispense: 1 packet; Refill: 11    5. Epigastric pain  Uncontrolled, unclear etiology. Could be related to uncontrolled constipation, however will check pancreatic enzyme, triglycerides, liver enzymes. No other corresponding symptoms such as heartburn, nausea, bloody stool, unintentional weight loss, excessive NSAID use or jaundice  - CBC Auto Differential; Future  - Comprehensive Metabolic Panel; Future  - Lipid Panel; Future  - LIPASE; Future    6. Chronic idiopathic constipation  Uncontrolled, related to chronic opioid use disorder. Would benefit from Trulance, however it is unlikely her insurance will cover this. She has tried and failed MiraLAX and other stool softeners. Start Dulcolax and enema as needed  - bisacodyl (DULCOLAX) 5 MG EC tablet;  Take 2 tablets by mouth daily as needed for Constipation Dispense: 60 tablet; Refill: 5  - Sodium Phosphates (FLEET) 7-19 GM/118ML; Place 1 enema rectally once as needed (constipation)  Dispense: 118 mL; Refill: 0    7. Opioid use disorder, severe, in early remission ([facility] [address]  Controlled, applauded success. Continue MAT  - buprenorphine-naloxone (SUBOXONE) 8-2 MG SUBL SL tablet    8. Anxiety and depression  Controlled, continue vraylar  - VRAYLAR 3 MG CAPS capsule    9. Primary insomnia  Uncontrolled, may benefit from higher dose of trazodone or alternative agent. We will discuss at follow-up appointment. - traZODone (DESYREL) 100 MG tablet    10. Attention deficit disorder (ADD) without hyperactivity  Controlled, continue Vyvanse  - VYVANSE 30 MG capsule    11. Alcohol use disorder, severe, in early remission, dependence ([facility] [address]  Sobriety since September, applauded patient success. Continue psychotherapy        Return in about 2 weeks (around 12/31/2021) for constipation, pap smear . HPI  Patient presents today to establish care with multiple concerns. She is currently in intensive outpatient care for opioid and alcohol use disorders. She has been sober since September 2021. She states that she feels confident and happy about her remission. She is doing well with her MAT. She requests podiatrist today as she is having left pinky toenail pain. She states that she has been \"digging\" at the area, and thinks something may be caught underneath her cuticle. No redness, swelling. No known injury to the area. Patient has a history of chronic constipation, most severe when she was detoxing in [facility]. She has been taking MiraLAX and stool softeners without improvement. She endorses straining, and hard pebble-like stools. No blood in her stool. She does have history of ADD, anxiety and depression, insomnia which is currently being managed by her mental health provider. She takes Vyvanse, Vraylar and trazodone for these problems respectively.   She severe, in early remission, dependence (HCC)    Chronic idiopathic constipation    Opioid use disorder, severe, in early remission (HCC)    Anxiety and depression    Primary insomnia    Attention deficit disorder (ADD) without hyperactivity       History reviewed. No pertinent surgical history. Social History     Socioeconomic History    Marital status: Single     Spouse name: Not on file    Number of children: 1    Years of education: Not on file    Highest education level: Not on file   Occupational History    Occupation:    Tobacco Use    Smoking status: Current Every Day Smoker     Packs/day: 0.50     Years: 15.00     Pack years: 7.50    Smokeless tobacco: Former User     Quit date: 6/21/2016   Vaping Use    Vaping Use: Never used   Substance and Sexual Activity    Alcohol use: No    Drug use: No    Sexual activity: Yes     Partners: Male     Birth control/protection: OCP   Other Topics Concern    Not on file   Social History Narrative    Lives at home with a friend      Social Determinants of Health     Financial Resource Strain: Low Risk     Difficulty of Paying Living Expenses: Not very hard   Food Insecurity: No Food Insecurity    Worried About Running Out of Food in the Last Year: Never true    Zena of Food in the Last Year: Never true   Transportation Needs:     Lack of Transportation (Medical): Not on file    Lack of Transportation (Non-Medical):  Not on file   Physical Activity:     Days of Exercise per Week: Not on file    Minutes of Exercise per Session: Not on file   Stress:     Feeling of Stress : Not on file   Social Connections:     Frequency of Communication with Friends and Family: Not on file    Frequency of Social Gatherings with Friends and Family: Not on file    Attends Yazdanism Services: Not on file    Active Member of Clubs or Organizations: Not on file    Attends Club or Organization Meetings: Not on file    Marital Status: Not on file General: Normal range of motion. Cervical back: Normal range of motion and neck supple. Comments: Normal-appearing left pinky toe. No tenderness at the site of the nailbed or toenail discoloration or ingrown toenail   Skin:     General: Skin is warm. Neurological:      Mental Status: She is alert. Psychiatric:         Mood and Affect: Mood normal.         Behavior: Behavior normal.         Immunization History   Administered Date(s) Administered    COVID-19, J&J, PF, 0.5 mL 07/01/2021    DTP 1985, 1985, 1987, 10/11/1991    HPV Quadrivalent (Gardasil) 03/04/2009, 05/27/2009, 09/24/2009    Hepatitis B 04/07/1999, 01/14/2002, 06/07/2005    Hib vaccine 10/11/1991    MMR 08/29/1988, 10/11/1991    Polio IPV (IPOL) 1987, 1987, 1987, 10/11/1991    Td vaccine (adult) 04/07/1999    Tdap (Boostrix, Adacel) 09/14/2007, 02/23/2015, 10/18/2015, 03/16/2019, 07/18/2019       Health Maintenance   Topic Date Due    Hepatitis C screen  Never done    Pneumococcal 0-64 years Vaccine (1 of 2 - PPSV23) Never done    HIV screen  Never done    Cervical cancer screen  Never done    COVID-19 Vaccine (2 - Booster for BigBad series) 08/26/2021    Flu vaccine (1) Never done    DTaP/Tdap/Td vaccine (10 - Td or Tdap) 07/18/2029    Hepatitis B vaccine  Completed    Hib vaccine  Completed    Hepatitis A vaccine  Aged Out    Meningococcal (ACWY) vaccine  Aged Out    Varicella vaccine  Discontinued       PSH, PMH, SH and FH reviewed and noted. Recent and past labs, tests and consults also reviewed. Recent or new meds also reviewed. Maureen Hawthorne,     This dictation was generated by voice recognition computer software. Although all attempts are made to edit the dictation for accuracy, there may be errors in the transcription that are not intended.

## 2021-12-17 NOTE — PATIENT INSTRUCTIONS
Call to schedule with the foot doctor:    Dr. Charly Encinas.  [facility] [address]  [phone]

## 2021-12-18 LAB
ESTIMATED AVERAGE GLUCOSE: 108.3 MG/DL
HBA1C MFR BLD: 5.4 %
HIV AG/AB: NORMAL
HIV ANTIGEN: NORMAL
HIV-1 ANTIBODY: NORMAL
HIV-2 AB: NORMAL
TOTAL SYPHILLIS IGG/IGM: NORMAL

## 2021-12-19 LAB
C. TRACHOMATIS DNA ,URINE: NEGATIVE
N. GONORRHOEAE DNA, URINE: NEGATIVE

## 2021-12-20 ENCOUNTER — TELEPHONE (OUTPATIENT)
Dept: PRIMARY CARE CLINIC | Age: 36
End: 2021-12-20

## 2021-12-20 NOTE — TELEPHONE ENCOUNTER
----- Message from Preeti Morales DO sent at 12/20/2021  6:58 AM EST -----  Please call patient and tell her that she is pregnant. Her blood work also shows that she has anemia. She should stop taking birth control pills. We will discuss both of these at her follow up appointment on 1/3. Thanks!

## 2021-12-21 ENCOUNTER — HOSPITAL ENCOUNTER (EMERGENCY)
Age: 36
Discharge: LEFT BEFORE DISCHARGE | End: 2021-12-21
Attending: EMERGENCY MEDICINE
Payer: COMMERCIAL

## 2021-12-21 VITALS
TEMPERATURE: 98.3 F | OXYGEN SATURATION: 100 % | HEIGHT: 62 IN | WEIGHT: 152.56 LBS | RESPIRATION RATE: 17 BRPM | SYSTOLIC BLOOD PRESSURE: 104 MMHG | BODY MASS INDEX: 28.07 KG/M2 | DIASTOLIC BLOOD PRESSURE: 69 MMHG | HEART RATE: 84 BPM

## 2021-12-21 DIAGNOSIS — Z34.90 EARLY STAGE OF PREGNANCY: Primary | ICD-10-CM

## 2021-12-21 DIAGNOSIS — R10.84 GENERALIZED ABDOMINAL PAIN: ICD-10-CM

## 2021-12-21 LAB
ABO/RH: NORMAL
ANTIBODY SCREEN: NORMAL
BACTERIA WET PREP: NORMAL
BASOPHILS ABSOLUTE: 0 K/UL (ref 0–0.2)
BASOPHILS RELATIVE PERCENT: 0.3 %
BILIRUBIN URINE: NEGATIVE
BLOOD, URINE: NEGATIVE
CLARITY: CLEAR
CLUE CELLS: NORMAL
COLOR: NORMAL
EOSINOPHILS ABSOLUTE: 0.1 K/UL (ref 0–0.6)
EOSINOPHILS RELATIVE PERCENT: 1.6 %
EPITHELIAL CELLS WET PREP: NORMAL
GLUCOSE URINE: NEGATIVE MG/DL
GONADOTROPIN, CHORIONIC (HCG) QUANT: NORMAL MIU/ML
HCG(URINE) PREGNANCY TEST: POSITIVE
HCT VFR BLD CALC: 34.8 % (ref 36–48)
HEMOGLOBIN: 11.6 G/DL (ref 12–16)
KETONES, URINE: NEGATIVE MG/DL
LEUKOCYTE ESTERASE, URINE: NEGATIVE
LYMPHOCYTES ABSOLUTE: 1.5 K/UL (ref 1–5.1)
LYMPHOCYTES RELATIVE PERCENT: 19.8 %
MCH RBC QN AUTO: 30.7 PG (ref 26–34)
MCHC RBC AUTO-ENTMCNC: 33.2 G/DL (ref 31–36)
MCV RBC AUTO: 92.4 FL (ref 80–100)
MICROSCOPIC EXAMINATION: NORMAL
MONOCYTES ABSOLUTE: 0.4 K/UL (ref 0–1.3)
MONOCYTES RELATIVE PERCENT: 5.6 %
NEUTROPHILS ABSOLUTE: 5.4 K/UL (ref 1.7–7.7)
NEUTROPHILS RELATIVE PERCENT: 72.7 %
NITRITE, URINE: NEGATIVE
PDW BLD-RTO: 12.5 % (ref 12.4–15.4)
PH UA: 7 (ref 5–8)
PLATELET # BLD: 196 K/UL (ref 135–450)
PMV BLD AUTO: 8.1 FL (ref 5–10.5)
PROTEIN UA: NEGATIVE MG/DL
RBC # BLD: 3.77 M/UL (ref 4–5.2)
RBC WET PREP: NORMAL
SOURCE WET PREP: NORMAL
SPECIFIC GRAVITY UA: 1.02 (ref 1–1.03)
TRICHOMONAS PREP: NORMAL
URINE TYPE: NORMAL
UROBILINOGEN, URINE: 1 E.U./DL
WBC # BLD: 7.4 K/UL (ref 4–11)
WBC WET PREP: NORMAL
YEAST WET PREP: NORMAL

## 2021-12-21 PROCEDURE — 86901 BLOOD TYPING SEROLOGIC RH(D): CPT

## 2021-12-21 PROCEDURE — 86850 RBC ANTIBODY SCREEN: CPT

## 2021-12-21 PROCEDURE — 36415 COLL VENOUS BLD VENIPUNCTURE: CPT

## 2021-12-21 PROCEDURE — 99282 EMERGENCY DEPT VISIT SF MDM: CPT

## 2021-12-21 PROCEDURE — 84702 CHORIONIC GONADOTROPIN TEST: CPT

## 2021-12-21 PROCEDURE — 87210 SMEAR WET MOUNT SALINE/INK: CPT

## 2021-12-21 PROCEDURE — 81003 URINALYSIS AUTO W/O SCOPE: CPT

## 2021-12-21 PROCEDURE — 84703 CHORIONIC GONADOTROPIN ASSAY: CPT

## 2021-12-21 PROCEDURE — 85025 COMPLETE CBC W/AUTO DIFF WBC: CPT

## 2021-12-21 PROCEDURE — 86900 BLOOD TYPING SEROLOGIC ABO: CPT

## 2021-12-21 ASSESSMENT — PAIN DESCRIPTION - PAIN TYPE: TYPE: ACUTE PAIN

## 2021-12-21 ASSESSMENT — PAIN DESCRIPTION - LOCATION: LOCATION: ABDOMEN

## 2021-12-21 ASSESSMENT — PAIN DESCRIPTION - PROGRESSION: CLINICAL_PROGRESSION: GRADUALLY WORSENING

## 2021-12-21 ASSESSMENT — PAIN SCALES - GENERAL
PAINLEVEL_OUTOF10: 7
PAINLEVEL_OUTOF10: 7

## 2021-12-21 ASSESSMENT — ENCOUNTER SYMPTOMS
SHORTNESS OF BREATH: 0
ABDOMINAL PAIN: 1
VOMITING: 0
BACK PAIN: 0
NAUSEA: 1

## 2021-12-21 ASSESSMENT — PAIN DESCRIPTION - DESCRIPTORS: DESCRIPTORS: CRAMPING

## 2021-12-21 ASSESSMENT — PAIN DESCRIPTION - FREQUENCY: FREQUENCY: INTERMITTENT

## 2021-12-21 ASSESSMENT — PAIN DESCRIPTION - ONSET: ONSET: PROGRESSIVE

## 2021-12-21 NOTE — ED PROVIDER NOTES
[address] ENCOUNTER      Pt Name: Manju Zambrano  MRN: [id number]  [place] 1985  Date of evaluation: 2021  Provider: Ricci Carey MD    CHIEF COMPLAINT       Chief Complaint   Patient presents with    Abdominal Cramping     concerned for pregnancy. x2 weeks. +nausea. HISTORY OF PRESENT ILLNESS   (Location/Symptom, Timing/Onset, Context/Setting, Quality, Duration, Modifying Factors, Severity)  Note limiting factors. Manju Zambrano is a 31 y.o. female who presents to the emergency department with crampy abdominal pain    HPI     This is a 71-year-old -American female with irregular periods however her last normal menstrual period she believes was about 3 months ago who presents with intermittent crampy abdominal pain for a few days and is concerned she is pregnant. Pain is about 2 or 3 out of 10 with no other aggravating leaving factors predominantly suprapubic to epigastric, associate with some nausea but she is tolerating p.o. without significant difficulty. Denies any vaginal bleeding or discharge and if she is pregnant today she is a G2, . Nursing Notes were reviewed. REVIEW OF SYSTEMS    (2-9 systems for level 4, 10 or more for level 5)     Review of Systems   Constitutional: Negative for fever. Respiratory: Negative for shortness of breath. Cardiovascular: Negative for chest pain. Gastrointestinal: Positive for abdominal pain and nausea. Negative for vomiting. Endocrine: Negative for polyuria. Genitourinary: Negative for dysuria. Musculoskeletal: Negative for back pain. All other systems reviewed and are negative. Except as noted above the remainder of the review of systems was reviewed and negative.        PAST MEDICAL HISTORY     Past Medical History:   Diagnosis Date    ADHD     Anxiety     Depression     Heroin abuse ([facility] [address]     clean since 2021         SURGICAL HISTORY     History reviewed. No pertinent surgical history. CURRENT MEDICATIONS       Previous Medications    BISACODYL (DULCOLAX) 5 MG EC TABLET    Take 2 tablets by mouth daily as needed for Constipation    BUPRENORPHINE-NALOXONE (SUBOXONE) 8-2 MG SUBL SL TABLET        IBUPROFEN (ADVIL;MOTRIN) 600 MG TABLET    Take 1 tablet by mouth every 6 hours as needed for Pain    TRAZODONE (DESYREL) 100 MG TABLET        VRAYLAR 3 MG CAPS CAPSULE        VYVANSE 30 MG CAPSULE           ALLERGIES     Patient has no known allergies. FAMILY HISTORY       Family History   Problem Relation Age of Onset    No Known Problems Mother     Diabetes Father     Diabetes Paternal Grandmother     Cancer Maternal Aunt         Breast CA          SOCIAL HISTORY       Social History     Socioeconomic History    Marital status: Single     Spouse name: None    Number of children: 1    Years of education: None    Highest education level: None   Occupational History    Occupation:    Tobacco Use    Smoking status: Current Every Day Smoker     Packs/day: 0.25     Years: 15.00     Pack years: 3.75    Smokeless tobacco: Former User     Quit date: 6/21/2016   Vaping Use    Vaping Use: Never used   Substance and Sexual Activity    Alcohol use: No    Drug use: Not Currently     Types: Opiates      Comment: clean since 9/2021    Sexual activity: Yes     Partners: Male     Birth control/protection: OCP   Other Topics Concern    None   Social History Narrative    Lives at home with a friend      Social Determinants of Health     Financial Resource Strain: Low Risk     Difficulty of Paying Living Expenses: Not very hard   Food Insecurity: No Food Insecurity    Worried About Running Out of Food in the Last Year: Never true    Zena of Food in the Last Year: Never true   Transportation Needs:     Lack of Transportation (Medical): Not on file    Lack of Transportation (Non-Medical):  Not on file   Physical Activity:     Days of Exercise per Week: Not on file    Minutes of Exercise per Session: Not on file   Stress:     Feeling of Stress : Not on file   Social Connections:     Frequency of Communication with Friends and Family: Not on file    Frequency of Social Gatherings with Friends and Family: Not on file    Attends Christian Services: Not on file    Active Member of [address] [place] [address] [employer] or Organizations: Not on file    Attends Club or Organization Meetings: Not on file    Marital Status: Not on file   Intimate Partner Violence:     Fear of Current or Ex-Partner: Not on file    Emotionally Abused: Not on file    Physically Abused: Not on file    Sexually Abused: Not on file   Housing Stability:     Unable to Pay for Housing in the Last Year: Not on file    Number of [place] in the Last Year: Not on file    Unstable Housing in the Last Year: Not on file       SCREENINGS                        PHYSICAL EXAM    (up to 7 for level 4, 8 or more for level 5)     ED Triage Vitals [12/21/21 1145]   BP Temp Temp Source Pulse Resp SpO2 Height Weight   108/67 98.3 °F (36.8 °C) Oral 94 16 100 % 5' 2\" (1.575 m) 152 lb 8.9 oz (69.2 kg)       Physical Exam  Exam conducted with a chaperone present. Constitutional:       General: She is not in acute distress. Appearance: Normal appearance. She is not ill-appearing. HENT:      Head: Normocephalic and atraumatic. Right Ear: Tympanic membrane and ear canal normal.      Left Ear: Tympanic membrane and ear canal normal.      Nose: Nose normal. No congestion. Mouth/Throat:      Mouth: Mucous membranes are moist.      Pharynx: No oropharyngeal exudate. Eyes:      Extraocular Movements: Extraocular movements intact. Pupils: Pupils are equal, round, and reactive to light. Cardiovascular:      Rate and Rhythm: Normal rate and regular rhythm. Heart sounds: No murmur heard. No friction rub. No gallop.     Pulmonary:      Effort: Pulmonary effort is normal.      Breath sounds: Normal breath sounds. Abdominal:      General: Bowel sounds are normal.      Palpations: Abdomen is soft. Tenderness: There is no abdominal tenderness. There is no guarding or rebound. Genitourinary:     General: Normal vulva. Exam position: Supine. Pubic Area: No rash or pubic lice. Labia:         Right: No rash. Left: No rash. Vagina: Vaginal discharge present. No tenderness. Cervix: Normal.      Uterus: Normal.       Adnexa: Right adnexa normal and left adnexa normal.      Rectum: Normal.   Musculoskeletal:         General: Normal range of motion. Cervical back: Normal range of motion and neck supple. Skin:     General: Skin is warm and dry. Capillary Refill: Capillary refill takes less than 2 seconds. Neurological:      General: No focal deficit present. Mental Status: She is alert and oriented to person, place, and time.    Psychiatric:         Mood and Affect: Mood normal.         Behavior: Behavior normal.         DIAGNOSTIC RESULTS     EKG: All EKG's are interpreted by the Emergency Department Physician who either signs or Co-signs this chart in the absence of a cardiologist.        RADIOLOGY:   Non-plain film images such as CT, Ultrasound and MRI are read by the radiologist. Plain radiographic images are visualized and preliminarily interpreted by the emergency physician with the below findings:        Interpretation per the Radiologist below, if available at the time of this note:    No orders to display         ED BEDSIDE ULTRASOUND:   Performed by ED Physician - none    LABS:  Labs Reviewed   CBC WITH AUTO DIFFERENTIAL - Abnormal; Notable for the following components:       Result Value    RBC 3.77 (*)     Hemoglobin 11.6 (*)     Hematocrit 34.8 (*)     All other components within normal limits    Narrative:     Performed at:  [facility]) [facility]  [address] [facility]   Phone [phone]   WET PREP, GENITAL    Narrative:     Performed at:  [facility]  [address], [facility]   Phone [phone]   PREGNANCY, URINE    Narrative:     Performed at:  [facility]  [address], [facility]   Phone [phone]   URINALYSIS    Narrative:     Performed at:  [facility]  [address], [facility]   Phone [phone]   HCG, QUANTITATIVE, PREGNANCY    Narrative:     Performed at:  [facility]  [address], [facility]   Phone [phone]   TYPE AND SCREEN       All other labs were within normal range or not returned as of this dictation. EMERGENCY DEPARTMENT COURSE and DIFFERENTIAL DIAGNOSIS/MDM:   Vitals:    Vitals:    12/21/21 1145   BP: 108/67   Pulse: 94   Resp: 16   Temp: 98.3 °F (36.8 °C)   TempSrc: Oral   SpO2: 100%   Weight: 152 lb 8.9 oz (69.2 kg)   Height: 5' 2\" (1.575 m)       Of history and physical exam were performed. I reviewed her past medical past surgical social and family history. MDM     Consider the diagnosis of pyelonephritis however she has no CVA tenderness. I considered the diagnosis of ectopic which is a possibility as she has had no imaging to date. She will be transferred over to [facility] to obtain a ultrasound for early OB. I spoke with Dr. Rafia Dillon emergency department who accepts the patient in transfer for ultrasound for early OB. REASSESSMENT          CRITICAL CARE TIME       CONSULTS:  None    PROCEDURES:  Unless otherwise noted below, none     Procedures        FINAL IMPRESSION      1. Early stage of pregnancy    2. Generalized abdominal pain          DISPOSITION/PLAN   DISPOSITION      Decision to Transfer    PATIENT REFERRED TO:  No follow-up provider specified.     DISCHARGE MEDICATIONS:  New Prescriptions No medications on file     Controlled Substances Monitoring:     No flowsheet data found.     (Please note that portions of this note were completed with a voice recognition program.  Efforts were made to edit the dictations but occasionally words are mis-transcribed.)    Christofer Patterson MD (electronically signed)  Attending Emergency Physician         Christofer Patetrson MD  12/21/21 [phone]

## 2021-12-21 NOTE — ED TRIAGE NOTES
Pt presents to ED ambulatory with c/o of abdominal cramping x2 weeks. +nausea. Concerned for pregnancy. Resp even and unlabored. A/ox4. No acute distress noted. Denies any need at this time. Call light within reach. Bed in lowest position. Will continue to monitor.

## 2021-12-21 NOTE — ED NOTES
Pt decided to drive self to [place] ED. Pt instructed to go straight to [place] ED. Verbalizes understanding.       Papo Schneider RN  12/21/21 1042

## 2021-12-21 NOTE — ED NOTES
Pt states does not have ride to [place] ED. Pt agreeable to go in ambulance. Transfer center contacted.       Maryuri Valdivia RN  12/21/21 9543

## 2021-12-21 NOTE — ED NOTES
Transfer center called and spoke to Porfirio Hutson RN to cancel transport.       Brandi Lambert RN  12/21/21 7301

## 2021-12-21 NOTE — ED NOTES
Report to charge, RN to assume care. Denies further questions.       Shiraz Pretty RN  12/21/21 2365

## 2021-12-22 ENCOUNTER — TELEPHONE (OUTPATIENT)
Dept: PRIMARY CARE CLINIC | Age: 36
End: 2021-12-22

## 2021-12-22 NOTE — TELEPHONE ENCOUNTER
----- Message from Ashleigh Roa sent at 12/22/2021 11:39 AM EST -----  Subject: Message to Provider    QUESTIONS  Information for Provider? Patient forgot the name of her \"foot doctor\" is   requesting the information of the doctor. ---------------------------------------------------------------------------  --------------  Sunny Ragland INFO  What is the best way for the office to contact you? Do not leave any   message, patient will call back for answer  Preferred Call Back Phone Number? [phone]  ---------------------------------------------------------------------------  --------------  SCRIPT ANSWERS  Relationship to Patient?  Self

## 2022-01-03 ENCOUNTER — OFFICE VISIT (OUTPATIENT)
Dept: PRIMARY CARE CLINIC | Age: 37
End: 2022-01-03
Payer: COMMERCIAL

## 2022-01-03 VITALS
SYSTOLIC BLOOD PRESSURE: 113 MMHG | WEIGHT: 149.8 LBS | HEIGHT: 62 IN | TEMPERATURE: 100 F | BODY MASS INDEX: 27.57 KG/M2 | DIASTOLIC BLOOD PRESSURE: 67 MMHG | HEART RATE: 92 BPM

## 2022-01-03 DIAGNOSIS — F32.A ANXIETY AND DEPRESSION: ICD-10-CM

## 2022-01-03 DIAGNOSIS — Z87.42 HISTORY OF ABNORMAL CERVICAL PAP SMEAR: ICD-10-CM

## 2022-01-03 DIAGNOSIS — F41.9 ANXIETY AND DEPRESSION: ICD-10-CM

## 2022-01-03 DIAGNOSIS — Z34.90 PREGNANCY, UNSPECIFIED GESTATIONAL AGE: Primary | ICD-10-CM

## 2022-01-03 DIAGNOSIS — D64.9 ANEMIA, UNSPECIFIED TYPE: ICD-10-CM

## 2022-01-03 PROCEDURE — G8427 DOCREV CUR MEDS BY ELIG CLIN: HCPCS | Performed by: STUDENT IN AN ORGANIZED HEALTH CARE EDUCATION/TRAINING PROGRAM

## 2022-01-03 PROCEDURE — 99214 OFFICE O/P EST MOD 30 MIN: CPT | Performed by: STUDENT IN AN ORGANIZED HEALTH CARE EDUCATION/TRAINING PROGRAM

## 2022-01-03 PROCEDURE — G8419 CALC BMI OUT NRM PARAM NOF/U: HCPCS | Performed by: STUDENT IN AN ORGANIZED HEALTH CARE EDUCATION/TRAINING PROGRAM

## 2022-01-03 PROCEDURE — G8484 FLU IMMUNIZE NO ADMIN: HCPCS | Performed by: STUDENT IN AN ORGANIZED HEALTH CARE EDUCATION/TRAINING PROGRAM

## 2022-01-03 PROCEDURE — 4004F PT TOBACCO SCREEN RCVD TLK: CPT | Performed by: STUDENT IN AN ORGANIZED HEALTH CARE EDUCATION/TRAINING PROGRAM

## 2022-01-03 RX ORDER — PNV NO.95/FERROUS FUM/FOLIC AC 28MG-0.8MG
1 TABLET ORAL DAILY
Qty: 30 TABLET | Refills: 11 | Status: SHIPPED | OUTPATIENT
Start: 2022-01-03

## 2022-01-03 RX ORDER — ONDANSETRON 4 MG/1
TABLET, FILM COATED ORAL
COMMUNITY
Start: 2021-12-28

## 2022-01-03 RX ORDER — RISPERIDONE 1 MG/1
TABLET, FILM COATED ORAL
COMMUNITY
Start: 2021-12-28

## 2022-01-03 ASSESSMENT — ENCOUNTER SYMPTOMS
SHORTNESS OF BREATH: 0
ABDOMINAL PAIN: 0
CONSTIPATION: 0
NAUSEA: 0
CHEST TIGHTNESS: 0

## 2022-01-03 NOTE — PATIENT INSTRUCTIONS
Call to set up an OB/GYN appointment    [address] [place], [place] [address]MD Rell Mejía, MICKI Ramos. [address]   Phone: [phone]      Patient Education        Learning About Pregnancy  Your Care Instructions     Your health in the early weeks of your pregnancy is particularly important for your baby's health. Take good care of yourself. Anything you do that harms your body can also harm your baby. Make sure to go to all of your doctor appointments. Regular checkups will help keep you and your baby healthy. How can you care for yourself at home? Diet    · Eat a balanced diet. Make sure your diet includes plenty of beans, peas, and leafy green vegetables.     · Do not skip meals or go for many hours without eating. If you are nauseated, try to eat a small, healthy snack every 2 to 3 hours.     · Do not eat fish that has a high level of mercury, such as shark, swordfish, or mackerel. Do not eat more than one can of tuna each week.     · Drink plenty of fluids. If you have kidney, heart, or liver disease and have to limit fluids, talk with your doctor before you increase the amount of fluids you drink.     · Cut down on caffeine, such as coffee, tea, and cola.     · Do not drink alcohol, such as beer, wine, or hard liquor.     · Take a multivitamin that contains at least 400 micrograms (mcg) of folic acid to help prevent birth defects. Fortified cereal and whole wheat bread are good additional sources of folic acid.     · Increase the calcium in your diet. Try to drink a quart of skim milk each day. You may also take calcium supplements and choose foods such as cheese and yogurt. Lifestyle    · Make sure you go to your follow-up appointments.     · Get plenty of rest. You may be unusually tired while you are pregnant.     · Get at least 30 minutes of exercise on most days of the week. Walking is a good choice. If you have not exercised in the past, start out slowly.  Take several short walks each day.     · Do not smoke. If you need help quitting, talk to your doctor about stop-smoking programs. These can increase your chances of quitting for good.     · Do not touch cat feces or litter boxes. Also, wash your hands after you handle raw meat, and fully cook all meat before you eat it. Wear gloves when you work in the yard or garden, and wash your hands well when you are done. Cat feces, raw or undercooked meat, and contaminated dirt can cause an infection that may harm your baby or lead to a miscarriage.     · Do not use saunas or hot tubs. Raising your body temperature may harm your baby.     · Avoid chemical fumes, paint fumes, or poisons.     · Do not use illegal drugs, marijuana, or alcohol. Medicines    · Review all of your medicines with your doctor. Some of your routine medicines may need to be changed to protect your baby.     · Use acetaminophen (Tylenol) to relieve minor problems, such as a mild headache or backache or a mild fever with cold symptoms. Do not use nonsteroidal anti-inflammatory drugs (NSAIDs), such as ibuprofen (Advil, Motrin) or naproxen (Aleve), unless your doctor says it is okay.     · Do not take two or more pain medicines at the same time unless the doctor told you to. Many pain medicines have acetaminophen, which is Tylenol. Too much acetaminophen (Tylenol) can be harmful.     · Take your medicines exactly as prescribed. Call your doctor if you think you are having a problem with your medicine. To manage morning sickness    · If you feel sick when you first wake up, try eating a small snack (such as crackers) before you get out of bed. Allow some time to digest the snack, and then get out of bed slowly.     · Do not skip meals or go for long periods without eating.  An empty stomach can make nausea worse.     · Eat small, frequent meals instead of three large meals each day.     · Drink plenty of fluids.     · Eat foods that are high in protein but low in fat.     · If you are taking iron supplements, ask your doctor if they are necessary. Iron can make nausea worse.     · Avoid any smells, such as coffee, that make you feel sick.     · Get lots of rest. Morning sickness may be worse when you are tired. Follow-up care is a key part of your treatment and safety. Be sure to make and go to all appointments, and call your doctor if you are having problems. It's also a good idea to know your test results and keep a list of the medicines you take. Where can you learn more? Go to https://[email or web address].[email or web address] org and sign in to your [employer] account. Enter M264 in the flexReceipts box to learn more about \"Learning About Pregnancy. \"     If you do not have an account, please click on the \"Sign Up Now\" link. Current as of: June 16, 2021               Content Version: 13.1  © 0373-1296 Healthwise, Incorporated. Care instructions adapted under license by [facility]). If you have questions about a medical condition or this instruction, always ask your healthcare professional. [place] [address] any warranty or liability for your use of this information.

## 2022-01-03 NOTE — PROGRESS NOTES
of keeping the pregnancy at this point in time, however would be helpful for decision to know how far along she is. She was recently seen in the emergency department when she received the news as it was hard for her to believe it was true. It was recommended that she be transported to [facility] for an OB ultrasound, but she opted to set up an outpatient appointment at [facility] instead. The appointment is scheduled for January 11. The patient uses public transportation, and lives in [place]. No abnormal vaginal bleeding or discharge. She does have a history of an abnormal Pap smear, and would like to repeat this today. Patient follows with a Suboxone clinic, and discontinued her Suboxone, trazodone, Charleen Beto when she found out she was pregnant. She followed up with the clinic, and was started on Subutex and risperidone. She is taking a daily multivitamin. She states during her previous pregnancy, her mood was well controlled on risperidone and gabapentin. She states that she would like to restart the gabapentin if possible as she is struggling with anxiety and pain. ROS  Review of Systems   Constitutional: Negative for activity change, appetite change, fatigue and unexpected weight change. Respiratory: Negative for chest tightness and shortness of breath. Cardiovascular: Negative for palpitations. Gastrointestinal: Negative for abdominal pain, constipation and nausea. Neurological: Negative for headaches. Psychiatric/Behavioral: Negative for agitation, decreased concentration, self-injury, sleep disturbance and suicidal ideas. The patient is nervous/anxious.         HISTORIES  Current Outpatient Medications on File Prior to Visit   Medication Sig Dispense Refill    risperiDONE (RISPERDAL) 1 MG tablet       ondansetron (ZOFRAN) 4 MG tablet       bisacodyl (DULCOLAX) 5 MG EC tablet Take 2 tablets by mouth daily as needed for Constipation 60 tablet 5    ibuprofen (ADVIL;MOTRIN) 600 MG tablet Take 1 tablet by mouth every 6 hours as needed for Pain 30 tablet 0     No current facility-administered medications on file prior to visit. No Known Allergies  Past Medical History:   Diagnosis Date    ADHD     Anxiety     Depression     Heroin abuse ([facility] [address]     clean since 9/2021     Patient Active Problem List   Diagnosis    Posttraumatic stress disorder    Tobacco use disorder    Alcohol use disorder, severe, in early remission, dependence (HCC)    Chronic idiopathic constipation    Opioid use disorder, severe, in early remission ([facility] [address]    Anxiety and depression    Primary insomnia    Attention deficit disorder (ADD) without hyperactivity     No past surgical history on file. Social History     Socioeconomic History    Marital status: Single     Spouse name: Not on file    Number of children: 1    Years of education: Not on file    Highest education level: Not on file   Occupational History    Occupation:    Tobacco Use    Smoking status: Current Every Day Smoker     Packs/day: 0.25     Years: 15.00     Pack years: 3.75    Smokeless tobacco: Former User     Quit date: 6/21/2016   Vaping Use    Vaping Use: Never used   Substance and Sexual Activity    Alcohol use: No    Drug use: Not Currently     Types: Opiates      Comment: clean since 9/2021    Sexual activity: Yes     Partners: Male     Birth control/protection: OCP   Other Topics Concern    Not on file   Social History Narrative    Lives at home with a friend      Social Determinants of Health     Financial Resource Strain: Low Risk     Difficulty of Paying Living Expenses: Not very hard   Food Insecurity: No Food Insecurity    Worried About Running Out of Food in the Last Year: Never true    Zena of Food in the Last Year: Never true   Transportation Needs:     Lack of Transportation (Medical): Not on file    Lack of Transportation (Non-Medical):  Not on file   Physical Activity:     Days of Exercise per Week: Not on file    Minutes of Exercise per Session: Not on file   Stress:     Feeling of Stress : Not on file   Social Connections:     Frequency of Communication with Friends and Family: Not on file    Frequency of Social Gatherings with Friends and Family: Not on file    Attends [employer] Services: Not on file    Active Member of [address] [place] [address] [employer] or Organizations: Not on file    Attends Club or Organization Meetings: Not on file    Marital Status: Not on file   Intimate Partner Violence:     Fear of Current or Ex-Partner: Not on file    Emotionally Abused: Not on file    Physically Abused: Not on file    Sexually Abused: Not on file   Housing Stability:     Unable to Pay for Housing in the Last Year: Not on file    Number of [place] in the Last Year: Not on file    Unstable Housing in the Last Year: Not on file      Family History   Problem Relation Age of Onset    No Known Problems Mother     Diabetes Father     Diabetes Paternal Grandmother     Cancer Maternal Aunt         Breast CA       PE  Vitals:    01/03/22 0723   BP: 113/67   Site: Right Upper Arm   Position: Sitting   Cuff Size: Medium Adult   Pulse: 92   Temp: 100 °F (37.8 °C)   TempSrc: Axillary   Weight: 149 lb 12.8 oz (67.9 kg)   Height: 5' 2\" (1.575 m)     Estimated body mass index is 27.4 kg/m² as calculated from the following:    Height as of this encounter: 5' 2\" (1.575 m). Weight as of this encounter: 149 lb 12.8 oz (67.9 kg). Physical Exam  Vitals reviewed. Constitutional:       General: She is not in acute distress. Appearance: Normal appearance. HENT:      Head: Normocephalic and atraumatic. Eyes:      Extraocular Movements: Extraocular movements intact. Pupils: Pupils are equal, round, and reactive to light. Cardiovascular:      Rate and Rhythm: Normal rate and regular rhythm. Pulses: Normal pulses. Heart sounds: Normal heart sounds.    Pulmonary:      Effort: Pulmonary effort is normal.      Breath sounds: Normal breath sounds. Abdominal:      General: Abdomen is flat. Bowel sounds are normal.      Palpations: Abdomen is soft. Genitourinary:     Comments: Normal external genitalia, no inguinal lymphadenopathy, vaginal mucosa moist and pink without lesions, cervix and cervical os visualized without abnormality. Neurological:      Mental Status: She is alert. Psychiatric:         Mood and Affect: Mood normal.         Behavior: Behavior normal.         Thought Content: Thought content normal.         Judgment: Judgment normal.         Ceasar Matt, DO    This dictation was generated by voice recognition computer software. Although all attempts are made to edit the dictation for accuracy, there may be errors in the transcription that are not intended.

## 2022-01-05 LAB
HPV COMMENT: NORMAL
HPV TYPE 16: NOT DETECTED
HPV TYPE 18: NOT DETECTED
HPVOH (OTHER TYPES): NOT DETECTED